# Patient Record
Sex: MALE | Race: BLACK OR AFRICAN AMERICAN | NOT HISPANIC OR LATINO | Employment: OTHER | URBAN - METROPOLITAN AREA
[De-identification: names, ages, dates, MRNs, and addresses within clinical notes are randomized per-mention and may not be internally consistent; named-entity substitution may affect disease eponyms.]

---

## 2020-02-19 PROBLEM — S43.101A DISLOCATION OF RIGHT ACROMIOCLAVICULAR JOINT: Status: ACTIVE | Noted: 2020-02-19

## 2023-11-28 ENCOUNTER — NURSE TRIAGE (OUTPATIENT)
Dept: ADMINISTRATIVE | Facility: CLINIC | Age: 38
End: 2023-11-28

## 2023-11-29 NOTE — TELEPHONE ENCOUNTER
"Reason for Disposition   Serious injury with multiple fractures (broken bones)    Additional Information   Negative: Passed out (i.e., lost consciousness, collapsed and was not responding)   Negative: Shock suspected (e.g., cold/pale/clammy skin, too weak to stand, low BP, rapid pulse)   Negative: [1] Similar pain previously AND [2] it was from "heart attack"   Negative: [1] Similar pain previously AND [2] it was from "angina" AND [3] not relieved by nitroglycerin   Negative: Sounds like a life-threatening emergency to the triager   Followed a shoulder injury    Protocols used: Shoulder Pain-A-AH, Shoulder Injury-A-AH    "

## 2023-11-29 NOTE — TELEPHONE ENCOUNTER
Patient states he has an one year old injury of a 'located shoulder'. He states he is in pain and he also has a fracture in the shoulder. Patient states he was suppose to go to [place] for shoulder surgery but he became incarcerated for 6 months and was not able to. Patient is advised as per protocol.